# Patient Record
Sex: FEMALE | Race: ASIAN | NOT HISPANIC OR LATINO | Employment: FULL TIME | URBAN - METROPOLITAN AREA
[De-identification: names, ages, dates, MRNs, and addresses within clinical notes are randomized per-mention and may not be internally consistent; named-entity substitution may affect disease eponyms.]

---

## 2017-11-05 ENCOUNTER — HOSPITAL ENCOUNTER (EMERGENCY)
Facility: CLINIC | Age: 62
Discharge: HOME | End: 2017-11-05
Attending: EMERGENCY MEDICINE | Admitting: EMERGENCY MEDICINE

## 2017-11-05 ENCOUNTER — APPOINTMENT (OUTPATIENT)
Dept: GENERAL RADIOLOGY | Facility: CLINIC | Age: 62
End: 2017-11-05
Attending: EMERGENCY MEDICINE

## 2017-11-05 VITALS
RESPIRATION RATE: 18 BRPM | TEMPERATURE: 98.9 F | OXYGEN SATURATION: 96 % | SYSTOLIC BLOOD PRESSURE: 152 MMHG | HEART RATE: 76 BPM | DIASTOLIC BLOOD PRESSURE: 72 MMHG

## 2017-11-05 DIAGNOSIS — R05.9 COUGH: Primary | ICD-10-CM

## 2017-11-05 DIAGNOSIS — R68.89 FLU-LIKE SYMPTOMS: ICD-10-CM

## 2017-11-05 PROCEDURE — 40000275 ZZH STATISTIC RCP TIME EA 10 MIN

## 2017-11-05 PROCEDURE — 99284 EMERGENCY DEPT VISIT MOD MDM: CPT | Mod: 25

## 2017-11-05 PROCEDURE — 25000125 ZZHC RX 250: Performed by: EMERGENCY MEDICINE

## 2017-11-05 PROCEDURE — 94640 AIRWAY INHALATION TREATMENT: CPT

## 2017-11-05 PROCEDURE — 71020 XR CHEST 2 VW: CPT

## 2017-11-05 RX ORDER — ALBUTEROL SULFATE 90 UG/1
2 AEROSOL, METERED RESPIRATORY (INHALATION) EVERY 6 HOURS PRN
Qty: 1 INHALER | Refills: 0 | Status: SHIPPED | OUTPATIENT
Start: 2017-11-05

## 2017-11-05 RX ORDER — IPRATROPIUM BROMIDE AND ALBUTEROL SULFATE 2.5; .5 MG/3ML; MG/3ML
3 SOLUTION RESPIRATORY (INHALATION)
Status: DISCONTINUED | OUTPATIENT
Start: 2017-11-05 | End: 2017-11-05 | Stop reason: HOSPADM

## 2017-11-05 RX ADMIN — IPRATROPIUM BROMIDE AND ALBUTEROL SULFATE 6 ML: .5; 3 SOLUTION RESPIRATORY (INHALATION) at 10:35

## 2017-11-05 RX ADMIN — IPRATROPIUM BROMIDE AND ALBUTEROL SULFATE 3 ML: .5; 3 SOLUTION RESPIRATORY (INHALATION) at 10:26

## 2017-11-05 ASSESSMENT — ENCOUNTER SYMPTOMS
VOMITING: 0
ABDOMINAL PAIN: 1
NAUSEA: 0
FEVER: 1
COUGH: 1

## 2017-11-05 NOTE — ED NOTES
Patient has had a cough for a week, denies fever but has had nausea after coughing.  recently diagnosed with influenza.

## 2017-11-05 NOTE — ED AVS SNAPSHOT
[facility] Emergency Department    [address]    [facility] [address]    Phone:  [phone]    Fax:  [phone]                                       Lucretia Peña   MRN: [id number]    Department:  [facility] Emergency Department   Date of Visit:  11/5/2017           Patient Information     Date Of Birth          1955        Your diagnoses for this visit were:     Cough     Flu-like symptoms        You were seen by Mac Oswald MD.      Follow-up Information     Schedule an appointment as soon as possible for a visit with [facility].    Why:  If symptoms worsen    Contact information:    [address]  [facility] [address]          Follow up with [facility] Emergency Department.    Specialty:  EMERGENCY MEDICINE    Why:  If symptoms worsen    Contact information:    [address]  [facility] [address]  [phone]        Discharge Instructions       Discharge Instructions  Upper Respiratory Infection    The upper respiratory tract includes the sinuses, nasal passages, pharynx, and larynx. A URI, or upper respiratory infection, is an infection of any of the parts of the upper airway. Symptoms include runny nose, congestion, sneezing, sore throat, cough, and fever. URIs are almost always caused by a virus. Antibiotics do not help with viral infections, so are generally not prescribed. A URI is very contagious through coughing and nasal secretions; make sure you wash your hands often and clean surfaces after sneezing, coughing or touching them. While you should start to improve in 3 - 5 days, remember that sometimes a cough can linger for several weeks.    Generally, every Emergency Department visit should have a follow-up clinic visit with either a primary or a specialty clinic/provider. Please follow-up as instructed by your emergency provider today.    Return to the Emergency Department if:    Any of your  symptoms get much worse.    You seem very sick, like being too weak to get up.    You have chest pain or shortness of breath.     You have a severe headache.    You are vomiting (throwing up) so much you cannot keep fluids or medicines down.    You have confusion or seem unusually drowsy.    You have a seizure.    What can I do to help myself?    Fill any prescriptions the provider gave you and take them right away    If you have a fever, get plenty of rest and drink lots of fluids, especially water.    Using a humidifier or saline nose spray will also help loosen mucous.     Clothes or blankets will not change your fever. Do what is comfortable for you.    Bathing or sponging in lukewarm water may help you feel better.    Acetaminophen (Tylenol ) or ibuprofen (Advil , Motrin ) will help bring fever down and may help you feel more comfortable. Be sure to read and follow the package directions, and ask your provider if you have questions.    Do not drink alcohol.    Decongestants may help you feel better. You may use decongestant nose sprays Afrin  (oxymetazoline) or Neeraj-Synephrine  (phenylephrine hydrochloride) for up to 3 days, or may use a decongestant tablet like Sudafed  (pseudoephedrine).  If you were given a prescription for medicine here today, be sure to read all of the information (including the package insert) that comes with your prescription.  This will include important information about the medicine, its side effects, and any warnings that you need to know about.  The pharmacist who fills the prescription can provide more information and answer questions you may have about the medicine.  If you have questions or concerns that the pharmacist cannot address, please call or return to the Emergency Department.   Remember that you can always come back to the Emergency Department if you are not able to see your regular provider in the amount of time listed above, if you get any new symptoms, or if there is  anything that worries you.      24 Hour Appointment Hotline       To make an appointment at any [facility], call [phone] ([phone]). If you don't have a family doctor or clinic, we will help you find one. [facility] are conveniently located to serve the needs of you and your family.             Review of your medicines      START taking        Dose / Directions Last dose taken    albuterol 108 (90 BASE) MCG/ACT Inhaler   Commonly known as:  PROAIR HFA/PROVENTIL HFA/VENTOLIN HFA   Dose:  2 puff   Quantity:  1 Inhaler        Inhale 2 puffs into the lungs every 6 hours as needed for shortness of breath / dyspnea or wheezing   Refills:  0                Prescriptions were sent or printed at these locations (1 Prescription)                   Other Prescriptions                Printed at Department/Unit printer (1 of 1)         albuterol (PROAIR HFA/PROVENTIL HFA/VENTOLIN HFA) 108 (90 BASE) MCG/ACT Inhaler                Procedures and tests performed during your visit     XR Chest 2 Views      Orders Needing Specimen Collection     None      Pending Results     No orders found from 11/3/2017 to 11/6/2017.            Pending Culture Results     No orders found from 11/3/2017 to 11/6/2017.            Pending Results Instructions     If you had any lab results that were not finalized at the time of your Discharge, you can call the ED Lab Result RN at [phone]. You will be contacted by this team for any positive Lab results or changes in treatment. The nurses are available 7 days a week from 10A to 6:30P.  You can leave a message 24 hours per day and they will return your call.        Test Results From Your Hospital Stay        11/5/2017 11:13 AM      Narrative     XR CHEST 2 VW 11/5/2017 11:06 AM     HISTORY: Cough, fever, right sided crackle at base concern for PNA        Impression     IMPRESSION: Negative exam.    SOILA BRADFORD MD                Clinical Quality Measure: Blood Pressure  Screening     Your blood pressure was checked while you were in the emergency department today. The last reading we obtained was  BP: 152/72 . Please read the guidelines below about what these numbers mean and what you should do about them.  If your systolic blood pressure (the top number) is less than 120 and your diastolic blood pressure (the bottom number) is less than 80, then your blood pressure is normal. There is nothing more that you need to do about it.  If your systolic blood pressure (the top number) is 120-139 or your diastolic blood pressure (the bottom number) is 80-89, your blood pressure may be higher than it should be. You should have your blood pressure rechecked within a year by a primary care provider.  If your systolic blood pressure (the top number) is 140 or greater or your diastolic blood pressure (the bottom number) is 90 or greater, you may have high blood pressure. High blood pressure is treatable, but if left untreated over time it can put you at risk for heart attack, stroke, or kidney failure. You should have your blood pressure rechecked by a primary care provider within the next 4 weeks.  If your provider in the emergency department today gave you specific instructions to follow-up with your doctor or provider even sooner than that, you should follow that instruction and not wait for up to 4 weeks for your follow-up visit.        Thank you for choosing [facility]       Thank you for choosing [facility] for your care. Our goal is always to provide you with excellent care. Hearing back from our patients is one way we can continue to improve our services. Please take a few minutes to complete the written survey that you may receive in the mail after you visit with us. Thank you!        [facility] Information     [facility] lets you send messages to your doctor, view your test results, renew your prescriptions, schedule appointments and more. To sign up, go to [email or web address] . Click on  "\"Log in\" on the left side of the screen, which will take you to the Welcome page. Then click on \"Sign up Now\" on the right side of the page.     You will be asked to enter the access code listed below, as well as some personal information. Please follow the directions to create your username and password.     Your access code is: [email or web address]  Expires: 2/3/2018 11:15 AM     Your access code will  in 90 days. If you need help or a new code, please call your [facility] clinic or [phone].        Care EveryWhere ID     This is your Care EveryWhere ID. This could be used by other organizations to access your [facility] medical records  [id number]        Equal Access to Services     KEHINDE GARNER : Alex Sandoval, wayemi mckeon, qarubén kaalmateodora andrea, jessie louis. So [facility] [phone].    ATENCIÓN: Si habla español, tiene a hernandez disposición servicios gratuitos de asistencia lingüística. Llame al [phone].    We comply with applicable federal civil rights laws and [place] laws. We do not discriminate on the basis of race, color, national origin, age, disability, sex, sexual orientation, or gender identity.            After Visit Summary       This is your record. Keep this with you and show to your community pharmacist(s) and doctor(s) at your next visit.                  "

## 2017-11-05 NOTE — DISCHARGE INSTRUCTIONS

## 2017-11-05 NOTE — ED PROVIDER NOTES
History     Chief Complaint:  Cough    HPI   Lucretia Peña is a 62 year old female who presents to the emergency department for evaluation of cough. The patient states that she has had over one week of congestion and productive nonbloody cough. She initially had fevers, though states that these have resolved. The patient also notes some slight abdominal pain with intense episodes of coughing, but this pain is not present otherwise. Of note, the patient's  was recently diagnosed with influenza. She was seen at an [facility] clinic for these symptoms on 11/1, at which time she was given a course of Tamiflu, albuterol inhaler, and a short burst of Prednisone. However, given the duration of her symptoms into today, the patient was concerned and decided to seek evaluation here in the ED. She denies any recent nausea or vomiting.     Allergies:  NKDA     Medications:    Albuterol  Prednisone    Past Medical History:    Asthma  HTN  Anemia      Past Surgical History:    The patient does not have any pertinent past surgical history  Family / Social History:    No past pertinent family history.     Social History:  Presents with her .   Current Every Day Smoker.   Marital Status:   [2]    Review of Systems   Constitutional: Positive for fever (Resolved).   HENT: Positive for congestion.    Respiratory: Positive for cough.    Gastrointestinal: Positive for abdominal pain. Negative for nausea and vomiting.   All other systems reviewed and are negative.    Physical Exam   First Vitals:  BP: 152/72  Pulse: 76  Temp: 98.9  F (37.2  C)  Resp: 18  SpO2: 96 %    Physical Exam  General: Alert, appears well-developed and well-nourished. Cooperative.     In no distress  HEENT:  Head:  Atraumatic  Ears:  External ears are normal  Mouth/Throat:  Oropharynx is without erythema or exudate and mucous membranes are moist.   Eyes:   Conjunctivae normal and EOM are normal. No scleral icterus.    Pupils are equal, round,  and reactive to light.   CV:  Normal rate, regular rhythm, normal heart sounds and radial and dorsalis pedis pulses are 2+ and symmetric.  No murmur.  Resp:  Breath sounds are coarse bilaterally    Crackle to the right lower lung field.      No expiratory wheezing.     Non-labored, no retractions or accessory muscle use  GI:  Abdomen is soft, no distension, no tenderness. No rebound or guarding.  MS:  Normal range of motion. No edema.    Normal strength in all 4 extremities.     Back atraumatic.  Skin:  Warm and dry.  No rash or lesions noted.  Neuro:   Alert. Normal strength.  Sensation intact in all 4 extremities. GCS: 15  Psych: Normal mood and affect.    Emergency Department Course   Imaging:  Radiographic findings were communicated with the patient who voiced understanding of the findings.    XR Chest 2 views:   Negative Exam  As per radiology.     Interventions:  1026 Duoneb 3 mL Nebulization   1035 Duoneb 6 mL Nebulization     Emergency Department Course:  Nursing notes and vitals reviewed. 1010 I performed an exam of the patient as documented above.     The patient was sent for a Chest XR while in the emergency department, findings above.     1114 I rechecked the patient and discussed the results of her workup thus far.     Findings and plan explained to the Patient. Patient discharged home with instructions regarding supportive care, medications, and reasons to return. The importance of close follow-up was reviewed. The patient was prescribed albuterol.     Impression & Plan    Medical Decision Making:  Lucretia Peña is a 62 year old female with a history of asthma and history of smoking who presents with one week of cough. The patient had fevers earlier in the week. She does endorse that her  with diagnosed with influenza. The patient was seen at an outside facility at the beginning of the week and was given a course of steroids, which she is still currently taking due to concern for possible  asthma exacerbation due to influenza-like illness. The patient was also apparently given Tamiflu at that time. The patient had coarse lungs throughout, but no expiratory wheezes. I do not think that the patient warrants any additional steroids at this time. We did try some Duonebs, which helped improve the patient's symptoms. Her albuterol inhaler was running low, so we did refill this. She had a crackle in her right base and we obtained a chest XR, which is negative for pneumonia. The patient most likely with a viral illness versus influenza. We will continue to trial conservative therapies on an outpatient basis, including refill of her albuterol. Follow up with primary care as needed. Close return precautions given and follow up instructions understood prior to discharge.     Diagnosis:    ICD-10-CM   1. Cough R05   2. Flu-like symptoms R68.89     Disposition:  discharged to home    Discharge Medications:  Discharge Medication List as of 11/5/2017 11:23 AM      START taking these medications    Details   albuterol (PROAIR HFA/PROVENTIL HFA/VENTOLIN HFA) 108 (90 BASE) MCG/ACT Inhaler Inhale 2 puffs into the lungs every 6 hours as needed for shortness of breath / dyspnea or wheezing, Disp-1 Inhaler, R-0, Local Print           IArina, am serving as a scribe on 11/5/2017 at 10:10 AM to personally document services performed by Mac Oswald MD based on my observations and the provider's statements to me.     Arina Cody  11/5/2017   [facility] EMERGENCY DEPARTMENT       Mac Oswald MD  11/05/17 1126

## 2017-11-05 NOTE — ED AVS SNAPSHOT
[facility] Emergency Department    [address]    [facility] [address]    Phone:  [phone]    Fax:  [phone]                                       Lucretia Peña   MRN: [id number]    Department:  [facility] Emergency Department   Date of Visit:  11/5/2017           After Visit Summary Signature Page     I have received my discharge instructions, and my questions have been answered. I have discussed any challenges I see with this plan with the nurse or doctor.    ..........................................................................................................................................  Patient/Patient Representative Signature      ..........................................................................................................................................  Patient Representative Print Name and Relationship to Patient    ..................................................               ................................................  Date                                            Time    ..........................................................................................................................................  Reviewed by Signature/Title    ...................................................              ..............................................  Date                                                            Time

## 2017-11-05 NOTE — LETTER
To Whom it may concern:      Lucretia Peña was seen in our Emergency Department today, 11/05/17.  I expect her condition to improve over the next 2 days.  She may return to work 11/7/2017  Sincerely,        Mac Oswald MD

## 2023-06-24 ENCOUNTER — OFFICE VISIT (OUTPATIENT)
Dept: URGENT CARE | Facility: URGENT CARE | Age: 68
End: 2023-06-24

## 2023-06-24 VITALS
DIASTOLIC BLOOD PRESSURE: 64 MMHG | WEIGHT: 156.8 LBS | TEMPERATURE: 96.9 F | SYSTOLIC BLOOD PRESSURE: 95 MMHG | HEART RATE: 67 BPM | OXYGEN SATURATION: 97 %

## 2023-06-24 DIAGNOSIS — Z72.0 TOBACCO ABUSE: ICD-10-CM

## 2023-06-24 DIAGNOSIS — R07.0 THROAT PAIN: ICD-10-CM

## 2023-06-24 DIAGNOSIS — Z87.09 HISTORY OF ASTHMA: ICD-10-CM

## 2023-06-24 DIAGNOSIS — J06.9 VIRAL URI: Primary | ICD-10-CM

## 2023-06-24 LAB
DEPRECATED S PYO AG THROAT QL EIA: NEGATIVE
GROUP A STREP BY PCR: NOT DETECTED

## 2023-06-24 PROCEDURE — 99204 OFFICE O/P NEW MOD 45 MIN: CPT | Performed by: FAMILY MEDICINE

## 2023-06-24 PROCEDURE — 87651 STREP A DNA AMP PROBE: CPT | Performed by: FAMILY MEDICINE

## 2023-06-24 RX ORDER — BENZONATATE 200 MG/1
200 CAPSULE ORAL 3 TIMES DAILY PRN
Qty: 21 CAPSULE | Refills: 0 | Status: SHIPPED | OUTPATIENT
Start: 2023-06-24 | End: 2023-07-01

## 2023-06-24 RX ORDER — ALBUTEROL SULFATE 90 UG/1
2 AEROSOL, METERED RESPIRATORY (INHALATION) EVERY 6 HOURS
Qty: 18 G | Refills: 0 | Status: SHIPPED | OUTPATIENT
Start: 2023-06-24 | End: 2023-07-24

## 2023-06-24 NOTE — PROGRESS NOTES
"SUBJECTIVE: Lucretia Peña is a 67 year old female presenting with a chief complaint of \"cold symptoms\".  Onset of symptoms was 3 day(s) ago.  Predisposing factors include tobacco use and HX of asthma.    No past medical history on file.  No Known Allergies  Social History     Tobacco Use     Smoking status: Not on file     Smokeless tobacco: Not on file   Substance Use Topics     Alcohol use: Not on file       ROS:  SKIN: no rash  GI: no vomiting    OBJECTIVE:  BP 95/64 (BP Location: Left arm, Patient Position: Sitting, Cuff Size: Adult Regular)   Pulse 67   Temp 96.9  F (36.1  C) (Tympanic)   Wt 71.1 kg (156 lb 12.8 oz)   SpO2 97% GENERAL APPEARANCE: healthy, alert and no distress  EYES: EOMI,  PERRL, conjunctiva clear  HENT: TM's normal bilaterally, rhinorrhea clear and oral mucous membranes moist, no erythema noted  RESP: lungs clear to auscultation - no rales, rhonchi or wheezes  SKIN: no suspicious lesions or rashes      ICD-10-CM    1. Viral URI  J06.9 benzonatate (TESSALON) 200 MG capsule      2. Throat pain  R07.0 Streptococcus A Rapid Screen w/Reflex to PCR - Clinic Collect     Group A Streptococcus PCR Throat Swab      3. Tobacco abuse  Z72.0       4. History of asthma  Z87.09 albuterol (PROAIR HFA) 108 (90 Base) MCG/ACT inhaler        Discontinue tobacco  Fluids/Rest, f/u if worse/not any better    "